# Patient Record
Sex: FEMALE | Race: WHITE | URBAN - METROPOLITAN AREA
[De-identification: names, ages, dates, MRNs, and addresses within clinical notes are randomized per-mention and may not be internally consistent; named-entity substitution may affect disease eponyms.]

---

## 2017-02-07 ENCOUNTER — OFFICE VISIT (OUTPATIENT)
Dept: FAMILY MEDICINE CLINIC | Age: 42
End: 2017-02-07

## 2017-02-07 VITALS — WEIGHT: 137.8 LBS | BODY MASS INDEX: 19.22 KG/M2

## 2017-02-07 DIAGNOSIS — F31.9 BIPOLAR 1 DISORDER (HCC): Primary | ICD-10-CM

## 2017-02-07 RX ORDER — GABAPENTIN 600 MG/1
600 TABLET ORAL
Refills: 2 | COMMUNITY
Start: 2017-01-03

## 2017-02-07 RX ORDER — BUSPIRONE HYDROCHLORIDE 10 MG/1
10 TABLET ORAL 2 TIMES DAILY
Refills: 0 | COMMUNITY
Start: 2017-01-03

## 2017-02-07 NOTE — PROGRESS NOTES
Pt insisting on Xanax, her Past PCP out of business suddenly. Has seen psych and they offer to taper benzo and she refuses. Has no scheduled psych appt and is almost out of benzo and insists on it.    Pt  Leaves her without med exam.

## 2017-02-07 NOTE — MR AVS SNAPSHOT
Visit Information Date & Time Provider Department Dept. Phone Encounter #  
 2/7/2017  1:00 PM Charlotta Primrose, [address] [id number] Upcoming Health Maintenance Date Due Pneumococcal 19-64 Medium Risk (1 of 1 - PPSV23) 7/29/1996 DTaP/Tdap/Td series (1 - Tdap) 7/29/1998 PAP AKA CERVICAL CYTOLOGY 7/29/1998 INFLUENZA AGE 9 TO ADULT 8/1/2016 Allergies as of 2/7/2017  Review Complete On: 12/11/2014 By: Leora Frank Severity Noted Reaction Type Reactions Morphine  12/11/2014   Side Effect Shortness of Breath Made her body feel like it was on fire Current Immunizations  Never Reviewed No immunizations on file. Not reviewed this visit Vitals Weight(growth percentile) BMI Smoking Status 137 lb 12.8 oz (62.5 kg) 19.22 kg/m2 Current Every Day Smoker BMI and BSA Data Body Mass Index Body Surface Area  
 19.22 kg/m 2 1.77 m 2 Your Updated Medication List  
  
   
This list is accurate as of: 2/7/17  1:34 PM.  Always use your most recent med list.  
  
  
  
  
 busPIRone 10 mg tablet Commonly known as:  BUSPAR Take 10 mg by mouth two (2) times a day.  
  
 gabapentin 600 mg tablet Commonly known as:  NEURONTIN Take 600 mg by mouth. 2 tablets by mouth at bedtime. promethazine 25 mg tablet Commonly known as:  PHENERGAN Take 25 mg by mouth daily. XANAX 1 mg tablet Generic drug:  ALPRAZolam  
Take  by mouth three (3) times daily. Introducing [facility]! Juan Antonio Arredondo introduces [employer] patient portal. Now you can access parts of your medical record, email your doctor's office, and request medication refills online. 1. In your internet browser, go to [email or web address] 2. Click on the First Time User? Click Here link in the Sign In box. You will see the New Member Sign Up page. 3. Enter your [employer] Access Code exactly as it appears below. You will not need to use this code after youve completed the sign-up process. If you do not sign up before the expiration date, you must request a new code. · [employer] Access Code: [email or web address] Expires: 5/8/2017  1:34 PM 
 
4. Enter the last four digits of your Social Security Number (xxxx) and Date of Birth (mm/dd/yyyy) as indicated and click Submit. You will be taken to the next sign-up page. 5. Create a [employer] ID. This will be your [employer] login ID and cannot be changed, so think of one that is secure and easy to remember. 6. Create a [employer] password. You can change your password at any time. 7. Enter your Password Reset Question and Answer. This can be used at a later time if you forget your password. 8. Enter your e-mail address. You will receive e-mail notification when new information is available in [address]. 9. Click Sign Up. You can now view and download portions of your medical record. 10. Click the Download Summary menu link to download a portable copy of your medical information. If you have questions, please visit the Frequently Asked Questions section of the [employer] website. Remember, [employer] is NOT to be used for urgent needs. For medical emergencies, dial 911. Now available from your iPhone and Android! Please provide this summary of care documentation to your next provider. Your primary care clinician is listed as Mina 116. If you have any questions after today's visit, please call [phone].

## 2017-02-08 RX ORDER — MOMETASONE FUROATE AND FORMOTEROL FUMARATE DIHYDRATE 200; 5 UG/1; UG/1
AEROSOL RESPIRATORY (INHALATION)
Refills: 3 | COMMUNITY
Start: 2017-01-03

## 2017-02-08 RX ORDER — SULFAMETHOXAZOLE AND TRIMETHOPRIM 800; 160 MG/1; MG/1
TABLET ORAL
Refills: 0 | COMMUNITY
Start: 2016-11-29 | End: 2017-02-14 | Stop reason: ALTCHOICE

## 2017-02-08 RX ORDER — TIOTROPIUM BROMIDE 18 UG/1
CAPSULE ORAL; RESPIRATORY (INHALATION)
Refills: 4 | COMMUNITY
Start: 2016-12-28

## 2017-02-08 RX ORDER — FLUCONAZOLE 150 MG/1
TABLET ORAL
Refills: 1 | COMMUNITY
Start: 2016-12-09 | End: 2017-02-14 | Stop reason: ALTCHOICE

## 2017-02-08 RX ORDER — OXYCODONE AND ACETAMINOPHEN 7.5; 325 MG/1; MG/1
TABLET ORAL
Refills: 0 | COMMUNITY
Start: 2016-11-29

## 2017-02-08 RX ORDER — MEDROXYPROGESTERONE ACETATE 150 MG/ML
INJECTION, SUSPENSION INTRAMUSCULAR
Refills: 3 | COMMUNITY
Start: 2016-12-08

## 2017-02-08 RX ORDER — METHYLPREDNISOLONE 4 MG/1
TABLET ORAL
Refills: 0 | COMMUNITY
Start: 2016-11-29 | End: 2017-02-14 | Stop reason: ALTCHOICE

## 2017-02-08 RX ORDER — IPRATROPIUM BROMIDE AND ALBUTEROL 20; 100 UG/1; UG/1
SPRAY, METERED RESPIRATORY (INHALATION)
Refills: 5 | COMMUNITY
Start: 2017-01-03

## 2017-02-14 ENCOUNTER — OFFICE VISIT (OUTPATIENT)
Dept: FAMILY MEDICINE CLINIC | Age: 42
End: 2017-02-14

## 2017-02-14 VITALS
BODY MASS INDEX: 19.01 KG/M2 | DIASTOLIC BLOOD PRESSURE: 61 MMHG | HEART RATE: 85 BPM | OXYGEN SATURATION: 100 % | WEIGHT: 135.8 LBS | TEMPERATURE: 97.9 F | HEIGHT: 71 IN | SYSTOLIC BLOOD PRESSURE: 111 MMHG | RESPIRATION RATE: 16 BRPM

## 2017-02-14 DIAGNOSIS — F31.9 BIPOLAR 1 DISORDER (HCC): Primary | ICD-10-CM

## 2017-02-14 NOTE — PROGRESS NOTES
HISTORY OF PRESENT ILLNESS  Aide Richard is a 46 y.o. female. HPI    The patient presents today to establish care. Previous PCP was ***. Last office visit ***. Medical Problems:  1.   2.     Current Specialists:  1.   2.     Acute complaints today:      Preventative Care  Flu shot:   TDaP:  Pap smear:  Mammogram:  Colonoscopy:  DEXA scan:  AAA screening:  Annual lung CT:     Healthy Habits  Patient is exercising ** per week. Patient endorses healthy diet; avoids fatty/greasy foods, sugar-sweetened beverages. Denies tobacco use. Social alcohol use. Denies illicit drug use.          ROS    Physical Exam    ASSESSMENT and PLAN  {ASSESSMENT/PLAN:53105}

## 2017-02-14 NOTE — PROGRESS NOTES
New patient to this office. Here to get a refill of her medication. Has been out of her xanax for over a week. Has been taking more of her buspar in an attempt to calm her anxiety. Was previously at the [address] [place] [address] on [facility]; received a notice from her insurance that the clinic was closing. Then got them filled by her PCP. Was referred to [facility], but they don't take her insurance. The family doctor \"didn't want to deal with this any more. \" Has been bouncing from one psychiatrist to another. Has been on the combination of xanax, buspar, and gabapentin for 2 years. Went to see a psychiatrist, but they did a cotton swab (genetic testing?) and was supposed to be scheduled back for a follow-up before her medication ran out; she was told that she was not going to be prescribed the xanax. Has been offered a taper, but did not take it. Has been on lots of psych meds in the past, but states that she was \"in a stupor. \" \"I refuse an antidepressant. I've tried so many of them, lady, but you're not putting me on that stuff. I've been on 'every antidepressant' out there and that shit don't work. I've been on seroquel, trazodone, abilify. They never told me about the side effects of what would happen if I came off those meds. I refuse it. I refuse it. \"     Chart review revealed similar attempt at obtaining xanax last week at another clinic. Attempted to explain that bipolar disorder should be managed by a psychiatrist, but that benzodiazepines are best used for anxiety related to known triggers (flying, etc.). Discussed that SSRIs/SNRIs are best option for managing anxiety (though not necessarily in someone with bipolar disorder). When I told the patient that I would not prescribe xanax but would happy to refer her to a psychiatrist and manage her other medical conditions, she got very angry and yelled, \"You listen here, you fucking bitch.  I hope you get anxiety and can't write yourself a fucking prescription. It's always this kind of shit. I'm dangerous when I'm off my medications. \" Patient leapt up from her seat, came towards me in a threatening manner at which point I excused myself from the room and she stated \"yeah, you go ahead - run. \" Patient left the treatment area while yelling at both me and my nurse. As she has been off xanax for 1 week, withdrawal is not a concern.

## 2017-02-14 NOTE — PROGRESS NOTES
While working at desk outside exam room. Exam room sounds alternated between repeated talking and crying. Patient became very irate when pcp discussed meds. Patient became extremely irate and verbally abusive when Mary Lou Mejia NP would not prescribe Xanax to her as she wanted it. PCP left room and and patient followed stating yeah run bitch. Patient was then offered to exit the building if she was not happy with care. Patient then threatened myself stating don't mess with me. When PCP picked up phone patient stated go ahead and call police.     Patient escorted out by  using foul language in reference to myself and Mary Lou Mejia NP.

## 2021-09-13 ENCOUNTER — OFFICE VISIT (OUTPATIENT)
Dept: NEUROLOGY | Age: 46
End: 2021-09-13
Payer: MEDICAID

## 2021-09-13 DIAGNOSIS — F31.9 BIPOLAR 1 DISORDER (HCC): ICD-10-CM

## 2021-09-13 DIAGNOSIS — R45.87 IMPULSIVE: ICD-10-CM

## 2021-09-13 DIAGNOSIS — R41.840 INATTENTION: ICD-10-CM

## 2021-09-13 DIAGNOSIS — F45.0 SOMATIZATION DISORDER: ICD-10-CM

## 2021-09-13 DIAGNOSIS — F43.10 PTSD (POST-TRAUMATIC STRESS DISORDER): Primary | ICD-10-CM

## 2021-09-13 DIAGNOSIS — R68.89 SPELLS OF DECREASED ATTENTIVENESS: ICD-10-CM

## 2021-09-13 DIAGNOSIS — F41.0 SEVERE ANXIETY WITH PANIC: ICD-10-CM

## 2021-09-13 PROCEDURE — 90791 PSYCH DIAGNOSTIC EVALUATION: CPT | Performed by: CLINICAL NEUROPSYCHOLOGIST

## 2021-09-13 NOTE — PROGRESS NOTES
[address] [facility],[address]  [address]   [address] [place] [address] [facility]   Vickey Carter    [phone] Office   [phone] Fax      Neuropsychology    Initial Diagnostic Interview Note      Referral:  PCP    Jennifer Mcfadden is a 42 y.o. right handed single  female who was accompanied by her teenaged daughter to the initial clinical interview on 9/13/21 . Please refer to her medical records for details pertaining to her history. At the start of the appointment, I reviewed the patient's [facility] Epic Chart (including Media scanned in from previous providers) for the active Problem List, all pertinent Past Medical Hx, medications, recent radiologic and laboratory findings. In addition, I reviewed pt's documented Immunization Record and Encounter History. Her mother used drugs during pregnancy. Patient had seizures and fevers and had to be resuscitated at age 5. She has panic and anxiety and when she has a panic attack she will pass out and seize. Last known event like this was in 2012. She went to senior year of high school and did not complete. No history of previously diagnosed LD and/or receipt of special education services. She has emphysema and COPD and she had been working as a . She has signed up for disability. She does get a check from the government to support her daughter. She has no other source of income. She has problems with attention and focus. She is not currently on any medications. Her psychiatrist took her meds away from her because the  there didn't like her. This was at [place] [facility]. She was given three months supply of medicine and then fired from practice saying she had verbally abused staff. She is in need of a new psychiatrist.  She is looking for a new PCP also. She was on Xanax and another doctor told her to go to [address] [place] [address] but she is not addicted. She has bipolar, possible PTSD, somatization. She tosses and turns at night. She is taking Elavil and on muscle relaxer for heart spasms, and phenergan. No previous neuropsych.      Neuropsychological Mental Status Exam (NMSE):      Historian: Good  Praxis: No UE apraxia  R/L Orientation: Intact to self and to other  Dress: within normal limits   Weight: within normal limits   Appearance/Hygiene: within normal limits   Gait: within normal limits   Assistive Devices: None  Mood: within normal limits   Affect: within normal limits   Comprehension: within normal limits   Thought Process: within normal limits   Expressive Language: within normal limits   Receptive Language: within normal limits   Motor:  No cognitive or motor perseveration  ETOH: Denied  Tobacco: 1 ppd  Marijuana: maybe 1 bowl per day  Illicit: Denied  SI/HI: Denied current  Psychosis: Denied  Insight: Within normal limits  Judgment: Within normal limits  Other Psych:      Past Medical History:   Diagnosis Date    Psychotic disorder     bipolar       Past Surgical History:   Procedure Laterality Date    HX  SECTION      HX TUBAL LIGATION         Allergies   Allergen Reactions    Morphine Shortness of Breath     Made her body feel like it was on fire       Family History   Problem Relation Age of Onset    Psychiatric Disorder Mother     Hypertension Mother     Heart Disease Father     Thyroid Cancer Sister     Psychiatric Disorder Sister     Psychiatric Disorder Brother     Psychiatric Disorder Brother     Psychiatric Disorder Sister     Psychiatric Disorder Sister     Psychiatric Disorder Sister     Psychiatric Disorder Sister     SIDS Sister        Social History     Tobacco Use    Smoking status: Current Every Day Smoker     Packs/day: 1.00     Types: Cigarettes    Smokeless tobacco: Never Used   Substance Use Topics    Alcohol use: No    Drug use: Yes     Types: Marijuana     Comment: quit about 1 1/2 years Current Outpatient Medications   Medication Sig Dispense Refill    COMBIVENT RESPIMAT  mcg/actuation inhaler USE 1 PUFF FOUR TIMES DAILY AS NEEDED FOR WHEEZING  5    medroxyPROGESTERone (DEPO-PROVERA) 150 mg/mL injection 150 MG IM ONCE,INSTR:TO BE ADMINISTERED IN CLINIC BY NURSE. SEE ORDER COMMENTS FOR SCHEDULE.  3    DULERA 200-5 mcg/actuation HFA inhaler INHALE 2 PUFFS TWICE A DAY  3    oxyCODONE-acetaminophen (PERCOCET 7.5) 7.5-325 mg per tablet TAKE 1 TABLET BY MOUTH EVERY 6 HOURS AS NEEDED FOR PAIN  0    SPIRIVA WITH HANDIHALER 18 mcg inhalation capsule INHALE 1 CAPSULE BY MOUTH INHALATION DAILY  4    gabapentin (NEURONTIN) 600 mg tablet Take 600 mg by mouth. 2 tablets by mouth at bedtime. 2    busPIRone (BUSPAR) 10 mg tablet Take 10 mg by mouth two (2) times a day.  0    ALPRAZolam (XANAX) 1 mg tablet Take  by mouth three (3) times daily.  promethazine (PHENERGAN) 25 mg tablet Take 25 mg by mouth daily. Plan:  Obtain authorization for testing from insurance company. Report to follow once testing, scoring, and interpretation completed. ? Organic based neurocognitive issues versus mood disorder or combination of same. ? Problems organic, functional, or both? This note will not be viewable in [address]

## 2021-11-03 ENCOUNTER — OFFICE VISIT (OUTPATIENT)
Dept: NEUROLOGY | Age: 46
End: 2021-11-03
Payer: MEDICAID

## 2021-11-03 DIAGNOSIS — F60.3 BORDERLINE PERSONALITY DISORDER IN ADULT (HCC): ICD-10-CM

## 2021-11-03 DIAGNOSIS — F90.0 ATTENTION DEFICIT HYPERACTIVITY DISORDER (ADHD), INATTENTIVE TYPE, MODERATE: ICD-10-CM

## 2021-11-03 DIAGNOSIS — F32.2 SEVERE MAJOR DEPRESSION (HCC): ICD-10-CM

## 2021-11-03 DIAGNOSIS — R46.5: ICD-10-CM

## 2021-11-03 DIAGNOSIS — F45.0 SOMATIZATION DISORDER: ICD-10-CM

## 2021-11-03 DIAGNOSIS — F41.0 SEVERE ANXIETY WITH PANIC: ICD-10-CM

## 2021-11-03 DIAGNOSIS — F43.10 PTSD (POST-TRAUMATIC STRESS DISORDER): Primary | ICD-10-CM

## 2021-11-03 PROCEDURE — 96138 PSYCL/NRPSYC TECH 1ST: CPT | Performed by: CLINICAL NEUROPSYCHOLOGIST

## 2021-11-03 PROCEDURE — 96131 PSYCL TST EVAL PHYS/QHP EA: CPT | Performed by: CLINICAL NEUROPSYCHOLOGIST

## 2021-11-03 PROCEDURE — 96136 PSYCL/NRPSYC TST PHY/QHP 1ST: CPT | Performed by: CLINICAL NEUROPSYCHOLOGIST

## 2021-11-03 PROCEDURE — 96130 PSYCL TST EVAL PHYS/QHP 1ST: CPT | Performed by: CLINICAL NEUROPSYCHOLOGIST

## 2021-11-03 PROCEDURE — 96139 PSYCL/NRPSYC TST TECH EA: CPT | Performed by: CLINICAL NEUROPSYCHOLOGIST

## 2021-11-03 PROCEDURE — 96137 PSYCL/NRPSYC TST PHY/QHP EA: CPT | Performed by: CLINICAL NEUROPSYCHOLOGIST

## 2021-11-03 NOTE — PROGRESS NOTES
[facility],[address]Th [address]  [address]   [address] [place] [address] [place] [address] [address]   [phone] Office   [phone] Fax      Psychological Evaluation Report      Referral:  PCP    Codi Vaca is a 42 y.o. right handed single  female who was accompanied by her teenaged daughter to the initial clinical interview on 9/13/21 . Please refer to her medical records for details pertaining to her history. At the start of the appointment, I reviewed the patient's [facility] Epic Chart (including Media scanned in from previous providers) for the active Problem List, all pertinent Past Medical Hx, medications, recent radiologic and laboratory findings. In addition, I reviewed pt's documented Immunization Record and Encounter History. Her mother used drugs during pregnancy. Patient had seizures and fevers and had to be resuscitated at age 5. She has panic and anxiety and when she has a panic attack she will pass out and seize. Last known event like this was in 2012. She went to senior year of high school and did not complete. No history of previously diagnosed LD and/or receipt of special education services. She has emphysema and COPD and she had been working as a . She has signed up for disability. She does get a check from the government to support her daughter. She has no other source of income. She has problems with attention and focus. She is not currently on any medications. Her psychiatrist took her meds away from her because the  there didn't like her. This was at [facility] in [place]. She was given three months supply of medicine and then fired from practice saying she had verbally abused staff. She is in need of a new psychiatrist.  She is looking for a new PCP also. She was on Xanax and another doctor told her to go to [address] [place] [address] but she is not addicted.  She has bipolar, possible PTSD, somatization. She tosses and turns at night. She is taking Elavil and on muscle relaxer for heart spasms, and phenergan. No previous neuropsych.      Neuropsychological Mental Status Exam (NMSE):      Historian: Good  Praxis: No UE apraxia  R/L Orientation: Intact to self and to other  Dress: within normal limits   Weight: within normal limits   Appearance/Hygiene: within normal limits   Gait: within normal limits   Assistive Devices: None  Mood: within normal limits   Affect: within normal limits   Comprehension: within normal limits   Thought Process: within normal limits   Expressive Language: within normal limits   Receptive Language: within normal limits   Motor:  No cognitive or motor perseveration  ETOH: Denied  Tobacco: 1 ppd  Marijuana: maybe 1 bowl per day  Illicit: Denied  SI/HI: Denied current  Psychosis: Denied  Insight: Within normal limits  Judgment: Within normal limits  Other Psych:      Past Medical History:   Diagnosis Date    Psychotic disorder     bipolar       Past Surgical History:   Procedure Laterality Date    HX  SECTION  1999    HX TUBAL LIGATION         Allergies   Allergen Reactions    Morphine Shortness of Breath     Made her body feel like it was on fire       Family History   Problem Relation Age of Onset    Psychiatric Disorder Mother     Hypertension Mother     Heart Disease Father     Thyroid Cancer Sister     Psychiatric Disorder Sister     Psychiatric Disorder Brother     Psychiatric Disorder Brother     Psychiatric Disorder Sister     Psychiatric Disorder Sister     Psychiatric Disorder Sister     Psychiatric Disorder Sister     SIDS Sister        Social History     Tobacco Use    Smoking status: Current Every Day Smoker     Packs/day: 1.00     Types: Cigarettes    Smokeless tobacco: Never Used   Substance Use Topics    Alcohol use: No    Drug use: Yes     Types: Marijuana     Comment: quit about 1 1/2 years       Current Outpatient Medications   Medication Sig Dispense Refill    COMBIVENT RESPIMAT  mcg/actuation inhaler USE 1 PUFF FOUR TIMES DAILY AS NEEDED FOR WHEEZING  5    medroxyPROGESTERone (DEPO-PROVERA) 150 mg/mL injection 150 MG IM ONCE,INSTR:TO BE ADMINISTERED IN CLINIC BY NURSE. SEE ORDER COMMENTS FOR SCHEDULE.  3    DULERA 200-5 mcg/actuation HFA inhaler INHALE 2 PUFFS TWICE A DAY  3    oxyCODONE-acetaminophen (PERCOCET 7.5) 7.5-325 mg per tablet TAKE 1 TABLET BY MOUTH EVERY 6 HOURS AS NEEDED FOR PAIN  0    SPIRIVA WITH HANDIHALER 18 mcg inhalation capsule INHALE 1 CAPSULE BY MOUTH INHALATION DAILY  4    gabapentin (NEURONTIN) 600 mg tablet Take 600 mg by mouth. 2 tablets by mouth at bedtime. 2    busPIRone (BUSPAR) 10 mg tablet Take 10 mg by mouth two (2) times a day.  0    ALPRAZolam (XANAX) 1 mg tablet Take  by mouth three (3) times daily.  promethazine (PHENERGAN) 25 mg tablet Take 25 mg by mouth daily. Plan:  Obtain authorization for testing from insurance company. Report to follow once testing, scoring, and interpretation completed. ? Organic based neurocognitive issues versus mood disorder or combination of same. ? Problems organic, functional, or both? This note will not be viewable in [address] Psychological Evaluation Results Follow  Patient Testing 11/3/21 Report Completed 11/4/21  A Psychometrist Assisted w/ portions of this evaluation while under my direct supervision    Neuropsychologist Administered, Interpreted, & Reported: Neuropsychological Mental Status Exam, Revised Memory & Behavior Checklist, MMSE, Clock Drawing, Test Of Premorbid Functioning, Eura Hammans Adult ADD Scales, History Taking  & Clinical Interview With The Patient,  KARLO, CPT, Jean-Paul-Melzack Pain Questionnaire, Review Of Available Records*.     Psychometrist Administered & Neuropsychologist Interpreted & Neuropsychologist Reported: Speech-Sounds Perception Test, EDUAR, Paced Serial Addition Test, Wechsler Adult Intelligence Scale  IV, Verbal Fluency Tests, Aidan & Aidan  Revised, Trailmaking Test Parts A & B, Buschke Selective Reminding Test, Timothy Complex Figure Test, Goodwin Depression Inventory  II, Goodwin Anxiety Inventory,. Test Findings:  Note:  The patients raw data have been compared with currently available norms which include demographic corrections for age, gender, and/or education. Sometimes, the patients scores are compared to demographically similar individuals as close to the patients age, education level, etc., as possible. \"Average\" is viewed as being +/- 1 standard deviation (SD) from the stated mean for a particular test score. \"Low average\" is viewed as being between 1 and 2 SD below the mean, and above average is viewed as being 1 and 2 SD above the mean. Scores falling in the borderline range (between 1-1/2 and 2 SD below the mean) are viewed with particular attention as to whether they are normal or abnormal neurocognitive test scores. Other methods of inference in analyzing the test data are also utilized, including the pattern and range of scores in the profile, bilateral motor functions, and the presence, if any, of pathognomonic signs. Behaviorally, the patient was friendly and cooperative and appeared motivated to perform well during this examination. Within this context, the results of this evaluation are viewed as a valid reflection of the patients actual neurocognitive and emotional status. Her structured word list fluency, as assessed by the FAS Test, was within the mildly to moderately impaired range with a T score of 31. Category fluency was within the mildly to moderately impaired range with a T score of 34. Confrontation naming ability, as assessed by the Aidan & Aidan  Revised, was within the mildly to moderately impaired range at 46/60 correct (T = 32).   This pattern of performance is indicative of a patient who is at mildly increased risk for day-to-day problems with verbal fluency and confrontation naming. The patient's self reported score of 72 [facility] Adult ADD Scales was within the elevated risk range for ADD related concerns. The patient was administered the [facility] Continuous Performance Test  III, a computer-administered test of sustained attention, and review of the subscales within this instrument did not reveal concern for inattentiveness without impulsivity. Verbal auditory attention and discrimination, as assessed by the Speech-Sounds Perception Test (T = 40) was within the below average range. Nonverbal auditory attention and discrimination, as assessed by the EDUAR, was also impaired. High level auditory information processing speed, as assessed by the Paced Serial Addition Test, was discontinued. The patient was administered the Wechsler Adult Intelligence Scale  IV. See Appendix I for full breakdown of IQ test scores (scanned into media section of this EMR). As can be seen, there was a clinically significant difference between her low average range Working Memory Index score of 83 (13th %ile) and her average range Processing Speed Index score of 105 (63rd  %ile). Her Verbal Comprehension Index score of 76 (5th %ile) is borderline. Her Perceptual Reasoning Index score of 84 (14th %ile) was within the low average range. Verbal comprehension is lower than what would be expected based on her performance on a test assessing estimated premorbid levels of functioning. The patient was administered the Buschke Selective Reminding Test and her basic learning and memory on this test (78/144) was impaired. this regard, her efficiency related Consistent Long Term Recall score was severely impaired (15/144), as was her Long Term Storage (64/144).   Her discrepancy score (+ 49 points) on the Buschke Selective Reminding Test is clinically significant and is suggestive of a high level cognitive organization impairment and/or high level attention problem. She is also showing general problems with learning and memory. Simple timed visual motor sequencing (Trailmaking Test Part A) was within the mildly impaired range with a T score of 38. Her performance on a similar, but more complex task of timed visual motor sequencing (Trailmaking Test Part B) was within the mildly to moderately impaired range with a T score of 31. She made only two sequencing errors on this latter test.   Taken together, this pattern of performance is not indicative of a patient who is at increased risk for day-to-day problems with executive functioning. The patient rated her current level of pain as \"0/5 - No Pain\" on the Jean-Paul-Melzack Pain Questionnaire. Her Goodwin Depression Inventory II score of 19 was within the mildly depressed range. Her Goodwin Anxiety Inventory score of 42 reflected severe anxiety. The patient was also administered the Personality Assessment Inventory and generated a valid profile for interpretation. In this context, the patient has significant hostility and suspiciousness who is acutely tense, fearful, and hypersensitive. The sensitivity in social interactions serves as a formidable obstacle to the development of close relationships. Those close relationships that are established are probably a source of ruminative worry for her. She harbors considerable anxiety or and resentment and the degree of her anxiety around social interaction may lessen the likelihood that this anger is directly expressed. Numerous maladaptive behavior patterns aimed at controlling anxiety are present. There is strong support for PTSD. She is quite suspicious and hostile with respect to other people. Working relationships are likely to be quite strained. She is plagued by worry to the degree that her ability to concentrate and to attend are significantly compromised.   There is very strong support for borderline personality disorder. She is quite emotionally labile, manifesting rapid and extreme mood swings along with episodes of poorly controlled anger. She appears uncertain about major life issues and has a history of involvement in intense and volatile relationships and is preoccupied consistently with fears of being abandoned or rejected by those around her. Marked depression is present. Self-esteem is fragile and will plummet in response to slights or oversights by other people. Notable day-to-day stress is reported. She has no control over her anger at many times. There is around her are probably intimidated by her explosive temper and the potential for physical violence. She reports periodic and transient thoughts of self-harm. Her self-reported level of treatment motivation is very low. She expresses considerable resistance to the idea that personal changes are needed. Impressions & Recommendations:  From the actual neurocognitive profile, there is support for diagnosis of inattentive ADHD. She is also showing problems with high-level cognitive organization. There is also evidence of learning disabilities, general problems with memory, and high-level cognitive organization. IQ's domain scores vary from the borderline to average range of functioning. Executive functions are normal.  From an emotional state report, there is severe anxiety, severe depression, somatization, PTSD, and borderline personality disorder. Her anxiety is severe to the degree whereby her ability to focus and to attend are further compromised. The pattern of normal versus abnormal neurocognitive test scores suggests that ADHD is a separate issue from emotional distress.   The former is organic and the latter functional.  In addition to continued medical care, my recommendations include a psychiatric review of her current medication management for severe depression and anxiety along with consideration for an appropriate medication for attention if this is not medically contraindicated. Caution is advised in selecting same, given the anxiety concerns here. Organizational skills training and support related to her learning disability is also advised. The patient is aggressive and easily irritated. Staff should be aware of this because this problemcombined with her hostility, suspiciousness, and general clinical psychiatric problems, will contribute to toxicity and difficulties in relationships with office staffing. However, she needs psychiatric and psychologic care and intervention to assist her. Unfortunately, her self-reported level of treatment motivation is low, and this further complicates the picture. I will have a rosemary discussion with her regarding these matters at follow-up. Until then, baseline now established. Follow-up as needed. Clinical correlation is, course, indicated. I wish her well. I will discuss these findings with the patient when she follows up with me in the near future. A follow up Neuropsychological Evaluation is indicated on a prn basis, especially if there are any cognitive and/or emotional changes. DIAGNOSES: Severe Anxiety    Severe Major Depression    ADHD, Inattentive    Somatization    PTSD    Borderline Personality         The above information is based upon information currently available to me. If there is any additional information of which I am currently unaware, I would be more than happy to review it upon having it made available to me. Thank you for the opportunity to see this interesting individual.     Sincerely,       [facility].  Catrachito Gordillo, [place]    Cc: PCP     Time Documentation:      65319 x 1 71467*1 Test administration/data gathering by Neuropsychologist (see above), 60 minutes  96138 x 1 Test administration, data gathering by technician (1st 30 minutes), 30 minutes  96139 x 5 Test administration, data gathering by technician (each additional 30 minutes), 3 hours (total tech 3 hours)   96130 x 1 Testing Evaluation Services By Neuropsychologist, 1st hour  16116 x 1 Testing Evaluation Services by Neuropsychologist, 2nd hour (45 minutes)  This includes review of referral question, reviewing records, planning test battery (50 minutes prior to testing date), and interpreting data (30 minutes), and interpretation and report writing (50 minutes)       Anticipated Integrated Feedback (99549) - Service to be completed on a future date and not currently billed. The above includes: Record review. Review of history provided by patient. Review of collaborative information. Testing by Clinician. Review of raw data. Scoring. Report writing of individual tests administered by Clinician. Integration of individual tests administered by psychometrist with NSE/testing by clinician, review of records/history/collaborative information, case Conceptualization, treatment planning, clinical decision making, report writing, coordination Of Care. Psychometry test codes as time spent by psychometrist administering and scoring neurocognitive/psychological tests under supervision of neuropsychologist.  Integral services including scoring of raw data, data interpretation, case conceptualization, report writing etcetera were initiated after the patient finished testing/raw data collected and was completed on the date the report was signed.